# Patient Record
(demographics unavailable — no encounter records)

---

## 2024-11-04 NOTE — REVIEW OF SYSTEMS
[Change in Activity] : change in activity [Joint Pains] : arthralgias [Appropriate Age Development] : development appropriate for age [Rash] : no rash [Heart Problems] : no heart problems [Congestion] : no congestion [Feeding Problem] : no feeding problem [Joint Swelling] : no joint swelling [Sleep Disturbances] : ~T no sleep disturbances

## 2024-11-04 NOTE — DATA REVIEWED
[de-identified] : Urgent care xrays in the system of the right shoulder reveal no evidence of fracture or dislocation. No widening AC joint.

## 2024-11-04 NOTE — PHYSICAL EXAM
[FreeTextEntry1] : GAIT: No limp. Good coordination and balance noted. GENERAL: alert, cooperative pleasant young 15 yo male in NAD SKIN: The skin is intact, warm, pink and dry over the area examined. EYES: Normal conjunctiva, normal eyelids and pupils were equal and round. ENT: normal ears, normal nose and normal lips. CARDIOVASCULAR: brisk capillary refill, but no peripheral edema. RESPIRATORY: The patient is in no apparent respiratory distress. They're taking full deep breaths without use of accessory muscles or evidence of audible wheezes or stridor without the use of a stethoscope. Normal respiratory effort. ABDOMEN: not examined   Right shoulder: no sts or deformity noted. Mild tenderness over the AC joint. Full ROM shoulder with good strength. Mild tenderness with adduction. distal motor intact brisk cap refill sensation grossly intact

## 2024-11-04 NOTE — HISTORY OF PRESENT ILLNESS
[Improving] : improving [FreeTextEntry1] : 15 yo male RHD presents with mother 6 days after injuring the right shoulder at football practice. On 10/29/24 he describes another player landing on his right shoulder during a tackle in football. He is having pain in the right shoulder with touching the area and movement. He went to urgent care and xrays revealed no evidence of fracture. he is here for f/u.  No other injury. no numbness or tingling.

## 2024-11-04 NOTE — ASSESSMENT
[FreeTextEntry1] : Right shoulder pain/mild AC sprain  The history for today's visit was obtained from the child, as well as the parent. The child's history was unreliable alone due to age and therefore, the parent was used today as an independent historian.  Urgent care xrays in the system of the right shoulder reveal no evidence of fracture or dislocation. No widening AC joint. The clinical exam and xrays were reviewed with patient and mother. He appears to have a mild AC sprain. He can start to resume activity as tolerated at this time. He will f/u on a PRN basis. All questions answered. Parent in agreement with the plan.  IDaya, MELI, PAC, have acted as a scribe and documented the above for Dr. Jimenez.  The above documentation completed by the PA is an accurate record of both my words and actions. Jude Jimenez MD.  This note was generated using Dragon medical dictation software.  A reasonable effort has been made for proofreading its contents, but typos may still remain.  If there are any questions or points of clarification needed please do not hesitate to contact my office.

## 2024-11-04 NOTE — REASON FOR VISIT
[Initial Evaluation] : an initial evaluation [Patient] : patient [Mother] : mother [FreeTextEntry1] : right shoulder pain

## 2024-11-04 NOTE — CONSULT LETTER
[Dear  ___] : Dear  [unfilled], [Consult Letter:] : I had the pleasure of evaluating your patient, [unfilled]. [Please see my note below.] : Please see my note below. [Consult Closing:] : Thank you very much for allowing me to participate in the care of this patient.  If you have any questions, please do not hesitate to contact me. [Sincerely,] : Sincerely, [FreeTextEntry3] : Jude Jimenez MD Division of Pediatric Orthopaedics [place] [address] [phone] fax: [phone]

## 2024-12-16 NOTE — PHYSICAL EXAM
[FreeTextEntry1] : Alert, comfortable, well-developed, in no apparent distress, well-oriented x3, 15-year-old young man.  He has no pain to palpation of the AC joint.  No signs of AC joint instability.  No pain with lateral compression of the shoulders.  No playing with active adduction of the shoulders.

## 2024-12-16 NOTE — HISTORY OF PRESENT ILLNESS
[FreeTextEntry1] : John returns.  He is a healthy and active 15-year-old young man who sustained what seems to be an sprain of his right AC joint all October 29.  He is here today with his father for a follow-up visit.  Overall has been doing well.  He feels much better.  Still some discomfort with certain movements.

## 2024-12-16 NOTE — ASSESSMENT
[FreeTextEntry1] : Diagnosis: Resolving right AC joint strain.  The history was obtained today from the child and parent; given the patient's age and/or the child's mental capacity, the history was unreliable and the parent was used as an independent historian.  Healthy 15-year-old young man 6 weeks status post the above injury.  He is doing much better.  He is allowed to resume full activities in 8 weeks time.  Follow-up on a as needed basis.  All of the father's questions were addressed. He understood and agreed with the plan.